# Patient Record
Sex: FEMALE | Race: BLACK OR AFRICAN AMERICAN
[De-identification: names, ages, dates, MRNs, and addresses within clinical notes are randomized per-mention and may not be internally consistent; named-entity substitution may affect disease eponyms.]

---

## 2018-05-09 ENCOUNTER — HOSPITAL ENCOUNTER (EMERGENCY)
Dept: HOSPITAL 97 - ER | Age: 33
Discharge: LEFT BEFORE DISCHARGE | End: 2018-05-09
Payer: SELF-PAY

## 2018-05-09 VITALS — DIASTOLIC BLOOD PRESSURE: 70 MMHG | OXYGEN SATURATION: 99 % | SYSTOLIC BLOOD PRESSURE: 124 MMHG | TEMPERATURE: 98.4 F

## 2018-05-09 DIAGNOSIS — T88.8XXA: Primary | ICD-10-CM

## 2018-05-09 PROCEDURE — 99281 EMR DPT VST MAYX REQ PHY/QHP: CPT

## 2018-05-09 NOTE — ER
Nurse's Notes                                                                                     

 [facility]                                                                

Name: Supriya Mckee                                                                              

Age: 32 yrs                                                                                       

Sex: Female                                                                                       

: 1985                                                                                   

MRN: [id number]                                                                                   

Arrival Date: 2018                                                                          

Time: 17:27                                                                                       

Account#: [id number]                                                                            

Bed Waiting                                                                                       

Private MD:                                                                                       

Diagnosis:                                                                                        

                                                                                                  

Presentation:                                                                                     

                                                                                             

17:41 Presenting complaint: Patient states: tummy tuck done 14 days go in [place]. my incision ch  

      is open, smells bad, and hurts. Transition of care: patient was not received from           

      another setting of care. Onset of symptoms was 2018. Initial Sepsis Screen:       

      Does the patient meet any 2 criteria? No. Patient's initial sepsis screen is negative.      

      Does the patient have a suspected source of infection? No. Patient's initial sepsis         

      screen is negative. Care prior to arrival: None.                                            

17:41 Method Of Arrival: Wheelchair                                                             

17:41 Acuity: MAYELA 3                                                                           ch  

                                                                                                  

Triage Assessment:                                                                                

17:41 General: Appears in no apparent distress. comfortable, Behavior is calm, cooperative,   ch  

      appropriate for age. Pain: Complains of pain in suprapubic area, right lower quadrant       

      and left lower quadrant Pain currently is 7 out of 10 on a pain scale.                      

                                                                                                  

OB/GYN:                                                                                           

17:41 LMP 2018                                                                            ch  

                                                                                                  

Historical:                                                                                       

- Allergies:                                                                                      

17:45 No Known Allergies;                                                                     ch  

- Home Meds:                                                                                      

17:45 amoxicillin, arnica, ordgon for pain, [Active]; Tylenol PM Extra Strength oral oral     ch  

      [Active];                                                                                   

- PMHx:                                                                                           

17:45 PCOS;                                                                                   ch  

- PSHx:                                                                                           

17:45 tummy tuck;                                                                             ch  

                                                                                                  

- Immunization history:: Adult Immunizations up to date, Flu vaccine is not up to date.           

- Social history:: Smoking status: Patient/guardian denies using tobacco.                         

                                                                                                  

                                                                                                  

Vital Signs:                                                                                      

17:41  / 70; Pulse 77; Resp 16; Temp 98.4; Pulse Ox 99% on R/A; Weight 83.91 kg; Height ch  

      5 ft. 7 in. (170.18 cm); Pain 6/10;                                                         

17:41 Body Mass Index 28.97 (83.91 kg, 170.18 cm)                                             ch  

                                                                                                  

ED Course:                                                                                        

17:27 Patient arrived in ED.                                                                  sb2 

17:41 Arm band placed on left wrist. Patient placed in waiting room, in a wheelchair.         ch  

17:42 Triage completed.                                                                       ch  

17:49 Herlinda Rebolledo FNP-C is [facility].                                                        kb  

17:49 Kong Bello MD is Attending Physician.                                              kb  

18:37 Patient's name was called from ER lobby. Unable to locate patient. Will disposition as    

      left without being seen by a provider.                                                      

                                                                                                  

Administered Medications:                                                                         

No medications were administered                                                                  

                                                                                                  

                                                                                                  

Outcome:                                                                                          

18:37 Eloped from waiting room, after seeing physician Time discovered patient gone: May 09,    

      2018 at 18:37                                                                               

18:38 Patient left the ED.                                                                      

                                                                                                  

Signatures:                                                                                       

Herlinda Rebolledo FNP-C FNP-Ckb Hammond, Christina, RN                  RN                                                      

Laura Rolon                               sb2                                                  

                                                                                                  

**************************************************************************************************

## 2018-05-10 NOTE — EDPHYS
Physician Documentation                                                                           

 [facility]                                                                

Name: Supriya Mckee                                                                              

Age: 32 yrs                                                                                       

Sex: Female                                                                                       

: 1985                                                                                   

MRN: [id number]                                                                                   

Arrival Date: 2018                                                                          

Time: 17:27                                                                                       

Account#: [id number]                                                                            

Bed Waiting                                                                                       

Private MD:                                                                                       

ED Physician Kong Bello                                                                       

HPI:                                                                                              

                                                                                             

18:43 This 32 yrs old Black Female presents to ER via Wheelchair with complaints of INFECTION kb  

      AND PAQIN FROM TUMMY TUCK.                                                                  

18:54 The patient has not experienced similar symptoms in the past. The patient has been      kb  

      recently seen by a physician:.                                                              

18:54 Pt states she had a tummy tuck in [place] 2 weeks ago. States she came home a week and a kb  

      half postop and started moving around more than she was supposed to so she popped a         

      stitch. States the incision slowly started opening and is now draining and smells bad.      

      . Onset: The symptoms/episode began/occurred 4 day(s) ago. Severity of symptoms: At         

      their worst the symptoms were moderate in the emergency department the symptoms are         

      unchanged.                                                                                  

                                                                                                  

OB/GYN:                                                                                           

17:41 LMP 2018                                                                            ch  

                                                                                                  

Historical:                                                                                       

- Allergies:                                                                                      

17:45 No Known Allergies;                                                                     ch  

- Home Meds:                                                                                      

17:45 amoxicillin, arnica, ordgon for pain, [Active]; Tylenol PM Extra Strength oral oral     ch  

      [Active];                                                                                   

- PMHx:                                                                                           

17:45 PCOS;                                                                                   ch  

- PSHx:                                                                                           

17:45 tummy tuck;                                                                             ch  

                                                                                                  

- Immunization history:: Adult Immunizations up to date, Flu vaccine is not up to date.           

- Social history:: Smoking status: Patient/guardian denies using tobacco.                         

                                                                                                  

                                                                                                  

ROS:                                                                                              

18:53 Constitutional: Negative for fever, chills, and weight loss, Cardiovascular: Negative   kb  

      for chest pain, palpitations, and edema, Respiratory: Negative for shortness of breath,     

      cough, wheezing, and pleuritic chest pain, Abdomen/GI: Negative for abdominal pain,         

      nausea, vomiting, diarrhea, and constipation, Back: Negative for injury and pain, :       

      Negative for injury, bleeding, discharge, and swelling, MS/Extremity: Negative for          

      injury and deformity, Neuro: Negative for headache, weakness, numbness, tingling, and       

      seizure.                                                                                    

18:53 Skin: Positive for of the abdomen, surgical incision with malodorous drainage.              

                                                                                                  

Exam:                                                                                             

18:52 Constitutional:  This is a well developed, well nourished patient who is awake, alert,  kb  

      and in no acute distress. Head/Face:  Normocephalic, atraumatic. Chest/axilla:  Normal      

      chest wall appearance and motion.  Nontender with no deformity.  No lesions are             

      appreciated. Cardiovascular:  Regular rate and rhythm with a normal S1 and S2.  No          

      gallops, murmurs, or rubs.  Normal PMI, no JVD.  No pulse deficits. Respiratory:  Lungs     

      have equal breath sounds bilaterally, clear to auscultation and percussion.  No rales,      

      rhonchi or wheezes noted.  No increased work of breathing, no retractions or nasal          

      flaring. Back:  No spinal tenderness.  No costovertebral tenderness.  Full range of         

      motion. MS/ Extremity:  Pulses equal, no cyanosis.  Neurovascular intact.  Full, normal     

      range of motion. Neuro:  Awake and alert, GCS 15, oriented to person, place, time, and      

      situation.  Cranial nerves II-XII grossly intact.  Motor strength 5/5 in all                

      extremities.  Sensory grossly intact.  Cerebellar exam normal.  Normal gait.                

18:52 Skin: Wound recheck: surgical incision to abdomen with purulent drainage.                   

                                                                                                  

Vital Signs:                                                                                      

17:41  / 70; Pulse 77; Resp 16; Temp 98.4; Pulse Ox 99% on R/A; Weight 83.91 kg; Height ch  

      5 ft. 7 in. (170.18 cm); Pain 6/10;                                                         

17:41 Body Mass Index 28.97 (83.91 kg, 170.18 cm)                                             ch  

                                                                                                  

MDM:                                                                                              

17:49 Patient medically screened.                                                             kb  

18:52 Data reviewed: vital signs, nurses notes. Data interpreted: Pulse oximetry: on room air kb  

      is 99 %. Interpretation: normal.                                                            

19:37 ED course: Pt was seen in triage, informed of plan of care (CBC, BMP, Wound Culture,    kb  

      Blood Culture, IV antibiotics, and possible CT abd/pelvis to r/o abscess), and told she     

      would be brought to a room to have diagnostics done as soon as one was available.           

      Verbal understanding received and pt was in agreement with plan of care. Pt left            

      shortly after being placed back into the lobby. .                                           

                                                                                                  

Administered Medications:                                                                         

No medications were administered                                                                  

                                                                                                  

                                                                                                  

Disposition:                                                                                      

22:35 Co-signature as Attending Physician, Kong Bello MD I agree with the assessment and   kdr 

      plan of care.                                                                               

                                                                                                  

Disposition:                                                                                      

18 18:38 Patient left the facility after being seen by provider.                            

- Patient left due to unknown.                                                                    

                                                                                                  

                                                                                                  

                                                                                                  

                                                                                                  

Signatures:                                                                                       

Herlinda Rebolledo FNP-C FNP-Elba Reyes, RN                  RN   Kong Hahn MD MD   kdr                                                  

                                                                                                  

**************************************************************************************************

## 2018-11-23 ENCOUNTER — HOSPITAL ENCOUNTER (EMERGENCY)
Dept: HOSPITAL 97 - ER | Age: 33
Discharge: HOME | End: 2018-11-23
Payer: SELF-PAY

## 2018-11-23 VITALS — OXYGEN SATURATION: 100 % | DIASTOLIC BLOOD PRESSURE: 90 MMHG | SYSTOLIC BLOOD PRESSURE: 120 MMHG

## 2018-11-23 VITALS — TEMPERATURE: 98 F

## 2018-11-23 DIAGNOSIS — N93.9: Primary | ICD-10-CM

## 2018-11-23 LAB
BUN BLD-MCNC: 7 MG/DL (ref 7–18)
GLUCOSE SERPLBLD-MCNC: 70 MG/DL (ref 74–106)
HCG SERPL-ACNC: < 1 MIU/ML (ref 1–3)
HCT VFR BLD CALC: 35.3 % (ref 36–45)
LYMPHOCYTES # SPEC AUTO: 1.4 K/UL (ref 0.7–4.9)
MCH RBC QN AUTO: 25.3 PG (ref 27–35)
MCV RBC: 76 FL (ref 80–100)
PMV BLD: 7.8 FL (ref 7.6–11.3)
POTASSIUM SERPL-SCNC: 3.5 MMOL/L (ref 3.5–5.1)
RBC # BLD: 4.64 M/UL (ref 3.86–4.86)

## 2018-11-23 PROCEDURE — 80048 BASIC METABOLIC PNL TOTAL CA: CPT

## 2018-11-23 PROCEDURE — 76830 TRANSVAGINAL US NON-OB: CPT

## 2018-11-23 PROCEDURE — 81025 URINE PREGNANCY TEST: CPT

## 2018-11-23 PROCEDURE — 36415 COLL VENOUS BLD VENIPUNCTURE: CPT

## 2018-11-23 PROCEDURE — 86901 BLOOD TYPING SEROLOGIC RH(D): CPT

## 2018-11-23 PROCEDURE — 86900 BLOOD TYPING SEROLOGIC ABO: CPT

## 2018-11-23 PROCEDURE — 96374 THER/PROPH/DIAG INJ IV PUSH: CPT

## 2018-11-23 PROCEDURE — 84702 CHORIONIC GONADOTROPIN TEST: CPT

## 2018-11-23 PROCEDURE — 96361 HYDRATE IV INFUSION ADD-ON: CPT

## 2018-11-23 PROCEDURE — 99284 EMERGENCY DEPT VISIT MOD MDM: CPT

## 2018-11-23 PROCEDURE — 85025 COMPLETE CBC W/AUTO DIFF WBC: CPT

## 2018-11-23 NOTE — EDPHYS
Physician Documentation                                                                           

 [facility]                                                                

Name: Supriya Mckee                                                                              

Age: 33 yrs                                                                                       

Sex: Female                                                                                       

: 1985                                                                                   

MRN: [id number]                                                                                   

Arrival Date: 2018                                                                          

Time: 12:38                                                                                       

Account#: [id number]                                                                            

Bed 30                                                                                            

Private MD: Kunal Aldana R ED Physician Juan J Brown                                                                      

HPI:                                                                                              

                                                                                             

15:14 This 33 yrs old Black Female presents to ER via Ambulatory with complaints of Vaginal   reddy 

      Bleeding, Weakness, Anemia.                                                                 

15:14 This 33 yrs old Black Female presents to ER via Ambulatory with complaints of Vaginal   reddy 

      Bleeding, Weakness, Anemia.                                                                 

15:14 The patient presents with vaginal bleeding that is moderate. Onset: The                 reddy 

      symptoms/episode began/occurred 2 week(s) ago. Modifying factors: The symptoms are          

      alleviated by nothing, the symptoms are aggravated by nothing. Associated signs and         

      symptoms: The patient has no apparent associated signs or symptoms. Severity of             

      symptoms: At their worst the symptoms were mild, in the emergency department the            

      symptoms are unchanged. The patient is sexually active. The patient has not experienced     

      similar symptoms in the past.                                                               

                                                                                                  

OB/GYN:                                                                                           

12:54 LMP 2018                                                                              aa5 

                                                                                                  

Historical:                                                                                       

- Allergies:                                                                                      

12:54 No Known Allergies;                                                                     aa5 

- PMHx:                                                                                           

12:54 PCOS;                                                                                   aa5 

- PSHx:                                                                                           

12:54 tummy tuck;                                                                             aa5 

                                                                                                  

- Immunization history:: Adult Immunizations unknown.                                             

- Social history:: Smoking status: Patient uses tobacco products, denies chronic                  

  smoking, but will smoke occasionally.                                                           

- Ebola Screening: : No symptoms or risks identified at this time.                                

- Family history:: not pertinent.                                                                 

                                                                                                  

                                                                                                  

ROS:                                                                                              

15:14 Constitutional: Negative for fever, chills, and weight loss, Eyes: Negative for injury, reddy 

      pain, redness, and discharge, ENT: Negative for injury, pain, and discharge, Neck:          

      Negative for injury, pain, and swelling, Cardiovascular: Negative for chest pain,           

      palpitations, and edema, Respiratory: Negative for shortness of breath, cough,              

      wheezing, and pleuritic chest pain, Abdomen/GI: Negative for abdominal pain, nausea,        

      vomiting, diarrhea, and constipation, Back: Negative for injury and pain, MS/Extremity:     

      Negative for injury and deformity, Skin: Negative for injury, rash, and discoloration,      

      Neuro: Negative for headache, weakness, numbness, tingling, and seizure, Psych:             

      Negative for depression, anxiety, suicide ideation, homicidal ideation, and                 

      hallucinations, Allergy/Immunology: Negative for hives, rash, and allergies, Endocrine:     

      Negative for neck swelling, polydipsia, polyuria, polyphagia, and marked weight             

      changes, Hematologic/Lymphatic: Negative for swollen nodes, abnormal bleeding, and          

      unusual bruising.                                                                           

15:14 : Positive for pelvic pain, vaginal bleeding.                                             

                                                                                                  

Exam:                                                                                             

15:14 Constitutional:  This is a well developed, well nourished patient who is awake, alert,  reddy 

      and in no acute distress. Head/Face:  Normocephalic, atraumatic. Eyes:  Pupils equal        

      round and reactive to light, extra-ocular motions intact.  Lids and lashes normal.          

      Conjunctiva and sclera are non-icteric and not injected.  Cornea within normal limits.      

      Periorbital areas with no swelling, redness, or edema. ENT:  Nares patent. No nasal         

      discharge, no septal abnormalities noted.  Tympanic membranes are normal and external       

      auditory canals are clear.  Oropharynx with no redness, swelling, or masses, exudates,      

      or evidence of obstruction, uvula midline.  Mucous membranes moist. Neck:  Trachea          

      midline, no thyromegaly or masses palpated, and no cervical lymphadenopathy.  Supple,       

      full range of motion without nuchal rigidity, or vertebral point tenderness.  No            

      Meningismus. Chest/axilla:  Normal chest wall appearance and motion.  Nontender with no     

      deformity.  No lesions are appreciated. Cardiovascular:  Regular rate and rhythm with a     

      normal S1 and S2.  No gallops, murmurs, or rubs.  Normal PMI, no JVD.  No pulse             

      deficits. Respiratory:  Lungs have equal breath sounds bilaterally, clear to                

      auscultation and percussion.  No rales, rhonchi or wheezes noted.  No increased work of     

      breathing, no retractions or nasal flaring. Abdomen/GI:  Soft, non-tender, with normal      

      bowel sounds.  No distension or tympany.  No guarding or rebound.  No evidence of           

      tenderness throughout. Back:  No spinal tenderness.  No costovertebral tenderness.          

      Full range of motion. Skin:  Warm, dry with normal turgor.  Normal color with no            

      rashes, no lesions, and no evidence of cellulitis. MS/ Extremity:  Pulses equal, no         

      cyanosis.  Neurovascular intact.  Full, normal range of motion. Neuro:  Awake and           

      alert, GCS 15, oriented to person, place, time, and situation.  Cranial nerves II-XII       

      grossly intact.  Motor strength 5/5 in all extremities.  Sensory grossly intact.            

      Cerebellar exam normal.  Normal gait. Psych:  Awake, alert, with orientation to person,     

      place and time.  Behavior, mood, and affect are within normal limits.                       

                                                                                                  

Vital Signs:                                                                                      

12:54  / 70; Pulse 90; Resp 18 S; Temp 98.0(TE); Pulse Ox 100% on R/A; Weight 85.73 kg  aa5 

      (R); Height 5 ft. 7 in. (170.18 cm) (R); Pain 6/10;                                         

13:24  / 68; Pulse 76; Resp 18; Pulse Ox 100% on R/A;                                   tl3 

15:03  / 79; Pulse 79; Resp 18; Pulse Ox 98% on R/A;                                    tl3 

15:40  / 68 Supine; Pulse 63; Resp 18; Pulse Ox 100% ;                                  tl3 

15:40  / 82 Sitting; Pulse 75; Resp 18; Pulse Ox 100% ;                                 tl3 

15:40  / 90 Standing; Pulse 81; Resp 18; Pulse Ox 100% on R/A;                          tl3 

16:18  / 90; Pulse 73; Resp 18; Pulse Ox 100% on R/A;                                   tl3 

12:54 Body Mass Index 29.60 (85.73 kg, 170.18 cm)                                             aa5 

                                                                                                  

MDM:                                                                                              

12:58 Patient medically screened.                                                             [facility] 

15:22 Data reviewed: vital signs, nurses notes, lab test result(s).                           [facility] 

15:25 Physician consultation: Patrice Wilder MD regarding patient's condition, CT,         [facility] 

      continue plan, add iron pills, follow up dr mendez.                                          

                                                                                                  

                                                                                             

13:00 Order name: Quantitative Hcg; Complete Time: 15:13                                      [facility] 

                                                                                             

13:00 Order name: Abo/rh Typing; Complete Time: 15:13                                         [facility] 

                                                                                             

13:00 Order name: Basic Metabolic Panel; Complete Time: 15:13                                 [facility] 

                                                                                             

13:00 Order name: CBC with Diff; Complete Time: 15:13                                         [facility] 

                                                                                             

13:00 Order name: US Transvaginal Study (Probe)                                               [facility] 

                                                                                             

14:45 Order name: Pregnancy Test, Urine; Complete Time: 15:44                                 [facility]

                                                                                             

13:00 Order name: Urine Pregnancy Test (obtain specimen); Complete Time: 15:00                [facility] 

                                                                                             

13:00 Order name: IV Saline Lock; Complete Time: 13:30                                        [facility] 

                                                                                             

13:00 Order name: Labs collected and sent; Complete Time: 13:30                               [facility] 

                                                                                             

13:00 Order name: NPO; Complete Time: 13:30                                                   [facility] 

                                                                                             

13:00 Order name: Urine Dipstick-Ancillary (obtain specimen); Complete Time: 15:00            [facility] 

                                                                                             

15:24 Order name: Orthostatics; Complete Time: 15:41                                          [facility] 

                                                                                                  

Administered Medications:                                                                         

13:37 Drug: NS 0.9% 1000 ml Route: IV; Rate: 1 bolus; Site: right forearm; Delivery: Primary  tl3 

      tubing;                                                                                     

14:58 Follow up: IV Status: Completed infusion; IV Intake: 1000ml                             tl3 

15:48 Drug: TORadol 30 mg Route: IVP; Infused Over: 2 mins; Site: right forearm;              tl3 

16:19 Follow up: Response: No adverse reaction                                                tl3 

                                                                                                  

                                                                                                  

Disposition:                                                                                      

18 15:19 Discharged to Home. Impression: Abnormal uterine and vaginal bleeding,             

  unspecified.                                                                                    

- Condition is Stable.                                                                            

- Discharge Instructions: Abnormal Uterine Bleeding, Dysmenorrhea, Dysfunctional                  

  Uterine Bleeding, Abnormal Uterine Bleeding, Easy-to-Read, Dysmenorrhea, Easy-to-Read.          

- Prescriptions for Ferrous Sulfate 325 mg (65 mg Iron) Oral Tablet - take 1 tablet by            

  ORAL route every 8 hours; 90 tablet. Motrin  mg Oral Tablet - take 2 tablet by            

  ORAL route every 6 hours As needed as needed with food; 28 tablet.                              

- Medication Reconciliation Form, Thank You Letter, Antibiotic Education, Prescription            

  Opioid Use form.                                                                                

- Follow up: Kunal Aldana MD; When: 2 - 3 days; Reason: Recheck today's complaints,              

  Continuance of care, Re-evaluation by your physician. Follow up: Brando Mendez MD;           

  When: 2 - 3 days; Reason: Recheck today's complaints, Continuance of care,                      

  Re-evaluation by your physician.                                                                

- Problem is new.                                                                                 

- Symptoms have improved.                                                                         

                                                                                                  

                                                                                                  

                                                                                                  

Signatures:                                                                                       

Dispatcher [employer]                           Juan J Maguire MD MD cha Calderon, Audri, RN                     RN   aa5                                                  

Monica Akins RN                       RN   tl3                                                  

                                                                                                  

Corrections: (The following items were deleted from the chart)                                    

15:20 15:19 2018 15:19 Discharged to Home. Impression: Abnormal uterine and vaginal     reddy 

      bleeding, unspecified. Condition is Stable. Forms are Medication Reconciliation Form,       

      Thank You Letter, Antibiotic Education, Prescription Opioid Use. Follow up: Kunal Aldana; When: 2 - 3 days; Reason: Recheck today's complaints, Continuance of care,           

      Re-evaluation by your physician. Problem is new. Symptoms have improved. reddy                

16:21 15:20 2018 15:19 Discharged to Home. Impression: Abnormal uterine and vaginal     tl3 

      bleeding, unspecified. Condition is Stable. Forms are Medication Reconciliation Form,       

      Thank You Letter, Antibiotic Education, Prescription Opioid Use. Follow up: Kunal Aldana; When: 2 - 3 days; Reason: Recheck today's complaints, Continuance of care,           

      Re-evaluation by your physician. Follow up: Brando Mendez; When: 2 - 3 days; Reason:       

      Recheck today's complaints, Continuance of care, Re-evaluation by your physician.           

      Problem is new. Symptoms have improved. reddy                                                 

                                                                                                  

**************************************************************************************************

## 2018-11-23 NOTE — ER
Nurse's Notes                                                                                     

 [facility]                                                                

Name: Supriya Mckee                                                                              

Age: 33 yrs                                                                                       

Sex: Female                                                                                       

: 1985                                                                                   

MRN: [id number]                                                                                   

Arrival Date: 2018                                                                          

Time: 12:38                                                                                       

Account#: [id number]                                                                            

Bed 30                                                                                            

Private MD: Kunal Aldana R                                                                       

Diagnosis: Abnormal uterine and vaginal bleeding, unspecified                                     

                                                                                                  

Presentation:                                                                                     

                                                                                             

12:51 Presenting complaint: Patient states: vaginal bleeding that began 2 months ago. Pt      aa5 

      states "I go through a pad and a tampon every 30 minutes since ". Pt reports abd      

      cramping. Transition of care: patient was not received from another setting of care.        

      Onset of symptoms was 2018. Risk Assessment: Do you want to hurt yourself or       

      someone else? Patient reports no desire to harm self or others. Initial Sepsis Screen:      

      Does the patient meet any 2 criteria? No. Patient's initial sepsis screen is negative.      

      Does the patient have a suspected source of infection? No. Patient's initial sepsis         

      screen is negative. Care prior to arrival: None.                                            

12:51 Method Of Arrival: Ambulatory                                                           aa5 

12:51 Acuity: MAYELA 2                                                                           aa5 

                                                                                                  

OB/GYN:                                                                                           

12:54 LMP 2018                                                                              aa5 

                                                                                                  

Historical:                                                                                       

- Allergies:                                                                                      

12:54 No Known Allergies;                                                                     aa5 

- PMHx:                                                                                           

12:54 PCOS;                                                                                   aa5 

- PSHx:                                                                                           

12:54 tummy tuck;                                                                             aa5 

                                                                                                  

- Immunization history:: Adult Immunizations unknown.                                             

- Social history:: Smoking status: Patient uses tobacco products, denies chronic                  

  smoking, but will smoke occasionally.                                                           

- Ebola Screening: : No symptoms or risks identified at this time.                                

- Family history:: not pertinent.                                                                 

                                                                                                  

                                                                                                  

Screenin:24 Abuse screen: Denies threats or abuse. Nutritional screening: No deficits noted.        tl3 

      Tuberculosis screening: No symptoms or risk factors identified. Fall Risk None              

      identified.                                                                                 

                                                                                                  

Assessment:                                                                                       

13:24 General: Appears comfortable, well groomed, well developed, well nourished, Behavior is tl3 

      cooperative, appropriate for age, anxious. Pain: Complains of pain in abdomen. Neuro:       

      No deficits noted. Level of Consciousness is awake, alert, obeys commands, Oriented to      

      person, place, time, situation, Appropriate for age. Cardiovascular: Patient's skin is      

      warm and dry. Respiratory: Airway is patent Respiratory effort is even, unlabored,          

      Respiratory pattern is regular, symmetrical. GI: No signs and/or symptoms were reported     

      involving the gastrointestinal system. : Reports vaginal bleeding that is bright red,     

      with clots, heavy flow since for the last couple of months, has been followed by Dr Carey, and placed on birth control twice daily to help regulate flow. Currently going       

      through a super tampon and maxi pad in under two hours. EENT: No deficits noted. Derm:      

      No deficits noted. Musculoskeletal: No deficits noted.                                      

15:03 Reassessment: Patient appears in no apparent distress at this time. No changes from     tl3 

      previously documented assessment. Patient and/or family updated on plan of care and         

      expected duration. Pain level reassessed. Patient is alert, oriented x 3, equal             

      unlabored respirations, skin warm/dry/pink. family at bedside, discussed available lab      

      results, no needs at this time.                                                             

16:18 Reassessment: Patient appears in no apparent distress at this time. No changes from     tl3 

      previously documented assessment. Patient and/or family updated on plan of care and         

      expected duration. Pain level reassessed. Patient is alert, oriented x 3, equal             

      unlabored respirations, skin warm/dry/pink. Dr Brown talking with pt and family          

      about POC.                                                                                  

                                                                                                  

Vital Signs:                                                                                      

12:54  / 70; Pulse 90; Resp 18 S; Temp 98.0(TE); Pulse Ox 100% on R/A; Weight 85.73 kg  aa5 

      (R); Height 5 ft. 7 in. (170.18 cm) (R); Pain 6/10;                                         

13:24  / 68; Pulse 76; Resp 18; Pulse Ox 100% on R/A;                                   tl3 

15:03  / 79; Pulse 79; Resp 18; Pulse Ox 98% on R/A;                                    tl3 

15:40  / 68 Supine; Pulse 63; Resp 18; Pulse Ox 100% ;                                  tl3 

15:40  / 82 Sitting; Pulse 75; Resp 18; Pulse Ox 100% ;                                 tl3 

15:40  / 90 Standing; Pulse 81; Resp 18; Pulse Ox 100% on R/A;                          tl3 

16:18  / 90; Pulse 73; Resp 18; Pulse Ox 100% on R/A;                                   tl3 

12:54 Body Mass Index 29.60 (85.73 kg, 170.18 cm)                                             aa5 

                                                                                                  

ED Course:                                                                                        

12:38 Patient arrived in ED.                                                                  mr  

12:39 Kunal Aldana MD is Private Physician.                                                  mr  

12:51 Arm band placed on.                                                                     aa5 

12:54 Triage completed.                                                                       aa5 

12:58 Juan J Brown MD is Attending Physician.                                             [facility] 

13:24 Monica Akins, RN is Primary Nurse.                                                     tl3 

13:24 Patient has correct armband on for positive identification. Bed in low position. Call   tl3 

      light in reach. Side rails up X 1. Side rails up X2. Pulse ox on. NIBP on. Warm blanket     

      given.                                                                                      

13:24 No provider procedures requiring assistance completed. Inserted saline lock: 22 gauge   tl3 

      in right forearm, using aseptic technique.                                                  

14:25 US Transvaginal Study (Probe) In Process Unspecified.                                   EDMS

15:00 Pregnancy Test, Urine Sent.                                                             tl3 

15:18 Kunal Aldana MD is Referral Physician.                                                 [facility] 

15:19 Brando Holley MD is Referral Physician.                                              [facility] 

16:18 IV discontinued, intact, bleeding controlled, No redness/swelling at site. Pressure     tl3 

      dressing applied.                                                                           

                                                                                                  

Administered Medications:                                                                         

13:37 Drug: NS 0.9% 1000 ml Route: IV; Rate: 1 bolus; Site: right forearm; Delivery: Primary  tl3 

      tubing;                                                                                     

14:58 Follow up: IV Status: Completed infusion; IV Intake: 1000ml                             tl3 

15:48 Drug: TORadol 30 mg Route: IVP; Infused Over: 2 mins; Site: right forearm;              tl3 

16:19 Follow up: Response: No adverse reaction                                                tl3 

                                                                                                  

                                                                                                  

Intake:                                                                                           

14:58 IV: 1000ml; Total: 1000ml.                                                              tl3 

                                                                                                  

Outcome:                                                                                          

15:19 Discharge ordered by MD.                                                                [facility] 

16:18 Discharged to home ambulatory.                                                          tl3 

16:18 Condition: stable                                                                           

16:18 Discharge instructions given to patient, Instructed on discharge instructions, follow       

      up and referral plans. medication usage, Demonstrated understanding of instructions,        

      follow-up care, medications, Prescriptions given X 2.                                       

16:21 Patient left the ED.                                                                    tl3 

                                                                                                  

Signatures:                                                                                       

Dispatcher [employer]                           EDMS                                                 

Juan J Brown MD MD cha Rivera, Mary                                 mr DockeyrGavi, RN                     RN   aa5                                                  

Monica Akins, RN                       RN   tl3                                                  

                                                                                                  

Corrections: (The following items were deleted from the chart)                                    

12:56 12:51 Acuity: MAYELA 3 aa5                                                                 aa5 

                                                                                                  

**************************************************************************************************

## 2018-11-23 NOTE — RAD REPORT
EXAM DESCRIPTION:  US - Transvaginal Study Probe - 11/23/2018 2:24 pm

 

CLINICAL HISTORY:  Pelvic pain, hyper menorrhagia

 

Preliminary findings provided at the time of the study.

 

COMPARISON:  None.

 

TECHNIQUE:  Endovaginal sonography was performed.

 

FINDINGS:  Small nabothian cyst identified. Endometrium-myometrium interface is normal. No discrete e
ndometrial mass or polyp identifiable. In the posterior mid and fundal portion of the uterus there is
 a 3 centimeter oval mass isoechoic to the surrounding myometrium. This extends from serosal 2 mucosa
l margin. There is minimal mass effect on the endometrial canal.

 

Endometrial stripe is 6 mm maximum thickness. Uterus measures 7.3 x 4.0 x 4.4 cm.

 

Both ovaries are identifiable. Doppler evaluation shows normal blood flow in the ovarian stroma. No s
olid or cystic ovarian or adnexal abnormality. No abnormal free fluid.

 

IMPRESSION:  Normal-size uterus demonstrating a 3 centimeter posterior fundal fibroid. There is mild 
mass effect on the endometrium but no invasion seen.

 

Endometrial stripe is 6 mm, normal, with no discrete endometrial mass or polyp.

 

No ovarian or adnexal finding.

## 2020-08-04 ENCOUNTER — HOSPITAL ENCOUNTER (EMERGENCY)
Dept: HOSPITAL 97 - ER | Age: 35
Discharge: HOME | End: 2020-08-04
Payer: SELF-PAY

## 2020-08-04 VITALS — SYSTOLIC BLOOD PRESSURE: 146 MMHG | OXYGEN SATURATION: 100 % | DIASTOLIC BLOOD PRESSURE: 102 MMHG

## 2020-08-04 VITALS — TEMPERATURE: 98 F
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
## 2020-08-04 DIAGNOSIS — S61.511A: Primary | ICD-10-CM

## 2020-08-04 DIAGNOSIS — F17.210: ICD-10-CM
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
## 2020-08-04 PROCEDURE — 99284 EMERGENCY DEPT VISIT MOD MDM: CPT

## 2020-08-04 PROCEDURE — 90714 TD VACC NO PRESV 7 YRS+ IM: CPT

## 2020-08-04 PROCEDURE — 90471 IMMUNIZATION ADMIN: CPT

## 2020-08-04 PROCEDURE — 0JQG0ZZ REPAIR RIGHT LOWER ARM SUBCUTANEOUS TISSUE AND FASCIA, OPEN APPROACH: ICD-10-PCS

## 2020-08-04 NOTE — RAD REPORT
EXAM DESCRIPTION:  RAD - Wrist Right 3 View - 8/4/2020 3:46 am

 

CLINICAL HISTORY:  wrist injury, laceration, eval for foreign body

Pain

 

COMPARISON:  No comparisons

 

FINDINGS:  No fracture or dislocation seen.  No foreign body or other soft tissue abnormality.

 

 

IMPRESSION:  Negative examination.

## 2020-08-04 NOTE — ER
Nurse's Notes                                                                                     

 [facility]                                                                 

Name: Supriya Mckee                                                                              

Age: 35 yrs                                                                                       

Sex: Female                                                                                       

: 1985                                                                                   

MRN: [id number]                                                                                   

Arrival Date: 2020                                                                          

Time: 02:54                                                                                       

Account#: [id number]                                                                            

Bed 18                                                                                            

Private MD:                                                                                       

Diagnosis: Superficial lacerations, right wrist, without foreign body                             

                                                                                                  

Presentation:                                                                                     

                                                                                             

03:18 Chief complaint: Patient states: PER PT WAS LOCKED OUT OF HOUSE TRIED TO GET IN BY      mt2 

      BREAKING WINDOW. 2 LACERATION ON RIGHT WRIST. Coronavirus screen: Client denies travel      

      out of the [place] in the last 14 days. At this time, the client does not indicate any         

      symptoms associated with coronavirus-19. Ebola Screen: No symptoms or risks identified      

      at this time. Complicating Factors: XRAY TO R/O GLASS. Initial Sepsis Screen: Does the      

      patient meet any 2 criteria? No. Patient's initial sepsis screen is negative. Does the      

      patient have a suspected source of infection? No. Patient's initial sepsis screen is        

      negative. Risk Assessment: Do you want to hurt yourself or someone else? Patient            

      reports no desire to harm self or others. Onset of symptoms was 2020.            

03:18 Method Of Arrival: Ambulatory                                                           mt2 

03:18 Acuity: MAYELA 4                                                                           mt2 

                                                                                                  

Triage Assessment:                                                                                

03:20 General: Appears uncomfortable, Behavior is cooperative. Pain: Complains of pain in     mt2 

      right hand Pain currently is 10 out of 10 on a pain scale. Neuro: No deficits noted.        

      Injury Description: Laceration sustained to right hand.                                     

                                                                                                  

OB/GYN:                                                                                           

04:39 LMP N/A -                                                                               mt2 

                                                                                                  

Historical:                                                                                       

- Allergies:                                                                                      

04:40 No Known Allergies;                                                                     mt2 

                                                                                                  

- Immunization history:: Adult Immunizations not up to date, Last tetanus immunization:           

  > 10 years ago.                                                                                 

- Family history:: not pertinent.                                                                 

- Social history:: Smoking status: Patient reports the use of cigarette tobacco                   

  products, denies chronic smoking, but will smoke occasionally, Patient/guardian                 

  denies using street drugs.                                                                      

- Hospitalizations: : No recent hospitalization is reported.                                      

                                                                                                  

                                                                                                  

Screenin:21 Abuse screen: Denies threats or abuse. Nutritional screening: No deficits noted.        mt2 

      Tuberculosis screening: No symptoms or risk factors identified. Fall Risk None              

      identified.                                                                                 

                                                                                                  

Assessment:                                                                                       

03:30 Injury Description: Laceration sustained to right hand is not bleeding.                 mt2 

04:38 Reassessment: Patient and/or family updated on plan of care and expected duration. Pain mt2 

      level reassessed. Patient is alert, oriented x 3, equal unlabored respirations, skin        

      warm/dry/pink. General: Appears uncomfortable. Pain: Complains of pain in right hand        

      Pain currently is 5 out of 10 on a pain scale. Musculoskeletal: No deficits noted.          

                                                                                                  

Vital Signs:                                                                                      

03:18  / 91; Pulse 91; Resp 16; Temp 98.0(O); Pulse Ox 98% ; Pain 10/10;                mt2 

04:38  / 102; Pulse 89; Pulse Ox 100% ; Pain 1/10;                                      mt2 

                                                                                                  

ED Course:                                                                                        

02:54 Patient arrived in ED.                                                                  ag3 

02:59 Awilda Medina, RN is Primary Nurse.                                                  mt2 

03:05 Nelson Miranda MD is Attending Physician.                                                rn  

03:20 Triage completed.                                                                       mt2 

03:21 Patient has correct armband on for positive identification. Bed in low position. Call   mt2 

      light in reach. Side rails up X 1.                                                          

03:30 Arm band placed on.                                                                     mt2 

03:47 XRAY Wrist RIGHT 3 view In Process Unspecified.                                         EDMS

04:39 Patient did not have IV access during this emergency room visit. Wound care: to         mt2 

      laceration was irrigated with normal saline.                                                

04:39 Assist provider with laceration repair Set up tray.                                     mt2 

                                                                                                  

Administered Medications:                                                                         

03:52 Drug: Lidocaine (1 %) 1 vials Volume: 5 ml; Route: Infiltration;                        mt2 

05:00 Follow up: Response: No adverse reaction                                                mt2 

03:52 Drug: Tetanus-Diphtheria Toxoid Adult 0.5 ml {: InboundWriter. Exp:         mt2 

      2023. Lot #: [id number]. } Route: IM; Site: left deltoid;                                  

05:00 Follow up: Response: No adverse reaction                                                mt2 

05:00 Drug: HYDROcodone-acetaminophen 5 mg-325 mg 1 tabs Route: PO;                           mt2 

05:01 Follow up: Response: Medication administered at discharge.                              mt2 

                                                                                                  

                                                                                                  

Outcome:                                                                                          

04:37 Discharge ordered by MD.                                                                rn  

04:59 Discharged to home ambulatory.                                                          mt2 

04:59 Condition: good                                                                             

04:59 Discharge instructions given to patient, Instructed on discharge instructions, follow       

      up and referral plans. medication usage, Demonstrated understanding of instructions,        

      follow-up care, medications, wound care.                                                    

05:02 Patient left the ED.                                                                    mt2 

                                                                                                  

Signatures:                                                                                       

Dispatcher [employer]                           EDNelson Munoz MD MD rn Gomez, Alice                                 [facility]                                                  

Awilda Medina RN                    RN   mt2                                                  

                                                                                                  

**************************************************************************************************

## 2021-05-13 ENCOUNTER — HOSPITAL ENCOUNTER (EMERGENCY)
Dept: HOSPITAL 97 - ER | Age: 36
Discharge: HOME | End: 2021-05-13
Payer: SELF-PAY

## 2021-05-13 VITALS — SYSTOLIC BLOOD PRESSURE: 132 MMHG | DIASTOLIC BLOOD PRESSURE: 83 MMHG

## 2021-05-13 VITALS — TEMPERATURE: 98.1 F | OXYGEN SATURATION: 100 %

## 2021-05-13 DIAGNOSIS — K21.9: ICD-10-CM

## 2021-05-13 DIAGNOSIS — O99.611: Primary | ICD-10-CM

## 2021-05-13 DIAGNOSIS — Z3A.01: ICD-10-CM

## 2021-05-13 DIAGNOSIS — F17.210: ICD-10-CM

## 2021-05-13 DIAGNOSIS — O99.331: ICD-10-CM

## 2021-05-13 LAB
ANISOCYTOSIS BLD QL: (no result)
BLD SMEAR INTERP: (no result)
BUN BLD-MCNC: 8 MG/DL (ref 7–18)
GLUCOSE SERPLBLD-MCNC: 87 MG/DL (ref 74–106)
HCT VFR BLD CALC: 29.2 % (ref 36–45)
HYPOCHROMIA BLD QL: (no result)
LIPASE SERPL-CCNC: 140 U/L (ref 73–393)
LYMPHOCYTES # SPEC AUTO: 1.6 K/UL (ref 0.7–4.9)
MORPHOLOGY BLD-IMP: (no result)
OVALOCYTES BLD QL SMEAR: (no result)
PMV BLD: 7.5 FL (ref 7.6–11.3)
POTASSIUM SERPL-SCNC: 4 MMOL/L (ref 3.5–5.1)
RBC # BLD: 4.64 M/UL (ref 3.86–4.86)
TROPONIN (EMERG DEPT USE ONLY): < 0.02 NG/ML (ref 0–0.04)

## 2021-05-13 PROCEDURE — 71045 X-RAY EXAM CHEST 1 VIEW: CPT

## 2021-05-13 PROCEDURE — 80048 BASIC METABOLIC PNL TOTAL CA: CPT

## 2021-05-13 PROCEDURE — 99285 EMERGENCY DEPT VISIT HI MDM: CPT

## 2021-05-13 PROCEDURE — 85025 COMPLETE CBC W/AUTO DIFF WBC: CPT

## 2021-05-13 PROCEDURE — 93005 ELECTROCARDIOGRAM TRACING: CPT

## 2021-05-13 PROCEDURE — 84484 ASSAY OF TROPONIN QUANT: CPT

## 2021-05-13 PROCEDURE — 83690 ASSAY OF LIPASE: CPT

## 2021-05-13 PROCEDURE — 36415 COLL VENOUS BLD VENIPUNCTURE: CPT

## 2021-05-13 NOTE — ER
Nurse's Notes                                                                                     

 [facility]                                                                 

Name: Supriya Mckee                                                                              

Age: 35 yrs                                                                                       

Sex: Female                                                                                       

: 1985                                                                                   

MRN: [id number]                                                                                   

Arrival Date: 2021                                                                          

Time: 20:52                                                                                       

Account#: [id number]                                                                            

Bed 15                                                                                            

Private MD:                                                                                       

Diagnosis: Chest pain, unspecified;Gastro-esophageal reflux disease                               

                                                                                                  

Presentation:                                                                                     

                                                                                             

21:01 Chief complaint: EMS states: About an hour ago pt began experiencing chest pressure     vg1 

      near mid sternum that would radiate to the right shoulder. Coronavirus screen: Client       

      denies travel out of the [place] in the last 14 days. Ebola Screen: Patient negative for       

      fever greater than or equal to 101.5 degrees Fahrenheit, and additional compatible          

      Ebola Virus Disease symptoms. Initial Sepsis Screen: Does the patient meet any 2            

      criteria? No. Patient's initial sepsis screen is negative. Does the patient have a          

      suspected source of infection? No. Patient's initial sepsis screen is negative. Risk        

      Assessment: Do you want to hurt yourself or someone else? Patient reports no desire to      

      harm self or others. Onset of symptoms was May 13, 2021.                                    

21:01 Method Of Arrival: EMS: [facility]                                                vg1 

21:01 Acuity: MAYELA 3                                                                           vg1 

                                                                                                  

OB/GYN:                                                                                           

21:07 LMP 2021                                                                           vg1 

                                                                                                  

Historical:                                                                                       

- Allergies:                                                                                      

21:04 No Known Allergies;                                                                     vg1 

- Home Meds:                                                                                      

21:04 Iron CR Oral [Active];                                                                  vg1 

- PMHx:                                                                                           

21:04 Anemia;                                                                                 vg1 

- PSHx:                                                                                           

21:04 Gastric Bypass; Abdominoplasty;                                                         vg1 

                                                                                                  

- Immunization history:: Adult Immunizations up to date.                                          

- Social history:: Smoking status: Patient reports the use of cigarette tobacco                   

  products, smokes one-half pack cigarettes per day.                                              

- Family history:: not pertinent.                                                                 

- Hospitalizations: : No recent hospitalization is reported.                                      

                                                                                                  

                                                                                                  

Screenin:06 Abuse screen: Denies threats or abuse. Nutritional screening: No deficits noted.        vg1 

      Tuberculosis screening: No symptoms or risk factors identified. Fall Risk No fall in        

      past 12 months (0 pts). No secondary diagnosis (0 pts). IV access (20 points).              

      Ambulatory Aid- None/Bed Rest/Nurse Assist (0 pts). Gait- Normal/Bed Rest/Wheelchair (0     

      pts) Mental Status- Oriented to own ability (0 pts). Total Neff Fall Scale indicates       

      No Risk (0-24 pts).                                                                         

                                                                                                  

Assessment:                                                                                       

20:50 General: Appears in no apparent distress. comfortable, Behavior is cooperative,         vg1 

      anxious. Pain: Complains of pain in chest Pain radiates to right shoulder Pain              

      currently is 5 out of 10 on a pain scale. Quality of pain is described as pressure,         

      Pain began 1 hour ago. Neuro: Level of Consciousness is awake, alert, obeys commands,       

      Oriented to person, place, time, situation. Cardiovascular: Patient's skin is warm and      

      dry. Respiratory: Airway is patent Respiratory effort is even, unlabored. GI: Reports       

      nausea. : No signs and/or symptoms were reported regarding the genitourinary system.      

      EENT: No signs and/or symptoms were reported regarding the EENT system. Derm: Skin is       

      intact, is healthy with good turgor. Musculoskeletal: Circulation, motion, and              

      sensation intact.                                                                           

22:11 Reassessment: Patient appears in no apparent distress at this time. Patient and/or      ca1 

      family updated on plan of care and expected duration. Pain level reassessed. Patient is     

      alert, oriented x 3, equal unlabored respirations, skin warm/dry/pink.                      

23:09 Reassessment: Patient appears in no apparent distress at this time. Patient is alert,   ca1 

      oriented x 3, equal unlabored respirations, skin warm/dry/pink. Pt refused to give          

      urine sample.                                                                               

                                                                                                  

Vital Signs:                                                                                      

21:01  / 97; Pulse 72; Resp 16; Temp 98.1; Pulse Ox 100% ; Weight 79.38 kg; Height 5    vg1 

      ft. 7 in. (170.18 cm); Pain 5/10;                                                           

23:09  / 83; Pulse 76; Resp 16 S; Pulse Ox 100% on R/A;                                 ca1 

21:01 Body Mass Index 27.41 (79.38 kg, 170.18 cm)                                             vg1 

                                                                                                  

ED Course:                                                                                        

20:52 Patient arrived in ED.                                                                  mw2 

20:57 Nelson Miranda MD is Attending Physician.                                                rn  

21:01 Barb Jensen, RN is Primary Nurse.                                                  vg1 

21:02 Triage completed.                                                                       vg1 

21:06 Patient maintains SpO2 saturation greater than 95% on room air.                         vg1 

21:06 Patient has correct armband on for positive identification. Placed in gown. Bed in low  vg1 

      position. Call light in reach. Side rails up X 1. [facility] officer at bedside.          

      Cardiac monitor on. Pulse ox on. NIBP on.                                                   

21:07 Arm band placed on.                                                                     vg1 

21:18 EKG done, by ED staff, reviewed by Nelson Miranda MD.                                      vg1 

21:30 Initial lab(s) drawn, by me, sent to lab. Inserted saline lock: 20 gauge in right       vg1 

      antecubital area, using aseptic technique. Blood collected.                                 

21:51 XRAY Chest (1 view) In Process Unspecified.                                             EDMS

22:24 Report given to JONAS Biswas.                                                             vg1 

23:09 No provider procedures requiring assistance completed. IV discontinued, intact,         ca1 

      bleeding controlled, No redness/swelling at site. Pressure dressing applied.                

                                                                                                  

Administered Medications:                                                                         

21:39 Drug: GI Cocktail without Donnatal - (Maalox Suspension 30 ml, Lidocaine Liquid 2 % 15  vg1 

      ml) Route: PO;                                                                              

23:00 Follow up: Response: No adverse reaction; Marked relief of symptoms; Pain is decreased  ca1 

                                                                                                  

                                                                                                  

Outcome:                                                                                          

22:43 Discharge ordered by MD.                                                                rn  

23:09 Discharged to Law Enforcement                                                           ca1 

23:09 Condition: stable                                                                           

23:09 Discharge instructions given to patient, Instructed on discharge instructions, follow       

      up and referral plans. Demonstrated understanding of instructions, follow-up care.          

23:10 Patient left the ED.                                                                    ca1 

                                                                                                  

Signatures:                                                                                       

Dispatcher [employer]                           EDMS                                                 

Nelson Miranda MD MD rn Westbrook, MyKena                            mw2                                                  

Romi, Zulay, RN                        RN   ca1                                                  

Barb Jensen RN                    RN   vg1                                                  

                                                                                                  

Corrections: (The following items were deleted from the chart)                                    

23:09 23:09 Reassessment: Patient appears in no apparent distress at this time. Patient is    ca1 

      alert, oriented x 3, equal unlabored respirations, skin warm/dry/pink. ca1                  

                                                                                                  

**************************************************************************************************

## 2021-05-13 NOTE — EDPHYS
Physician Documentation                                                                           

 [facility]                                                                 

Name: Supriya Mckee                                                                              

Age: 35 yrs                                                                                       

Sex: Female                                                                                       

: 1985                                                                                   

MRN: [id number]                                                                                   

Arrival Date: 2021                                                                          

Time: 20:52                                                                                       

Account#: [id number]                                                                            

Bed 15                                                                                            

Private MD:                                                                                       

ED Physician Nelson Miranda                                                                         

HPI:                                                                                              

                                                                                             

21:02 This 35 yrs old Black Female presents to ER via Unassigned with complaints of Chest     rn  

      Pain.                                                                                       

21:02 The patient or guardian reports chest pain that is located primarily in the substernal  rn  

      area. The pain does not radiate. Associated signs and symptoms: Pertinent negatives:        

      abdominal pain, cough, headache, near syncope, palpitations, shortness of breath,           

      syncope, vomiting. The chest pain is described as a heaviness. Duration: The patient or     

      guardian reports a single episode. Modifying factors: The symptoms are alleviated by        

      nothing. the symptoms are aggravated by nothing. Severity of pain: At its worst the         

      pain was moderate in the emergency department the pain has resolved. The patient has        

      not experienced similar symptoms in the past. Reports 15 min ago began with chest pain,     

      non-radiating, not assoc with fever/sob/trauma/hemoptysis. Never had before. In in          

      longterm. Reports approx 7 weeks pregnant. No vomiting/diarrhea. No meds given and now          

      resolved. .                                                                                 

                                                                                                  

OB/GYN:                                                                                           

21:07 LMP 2021                                                                           vg1 

                                                                                                  

Historical:                                                                                       

- Allergies:                                                                                      

21:04 No Known Allergies;                                                                     vg1 

- Home Meds:                                                                                      

21:04 Iron CR Oral [Active];                                                                  vg1 

- PMHx:                                                                                           

21:04 Anemia;                                                                                 vg1 

- PSHx:                                                                                           

21:04 Gastric Bypass; Abdominoplasty;                                                         vg1 

                                                                                                  

- Immunization history:: Adult Immunizations up to date.                                          

- Social history:: Smoking status: Patient reports the use of cigarette tobacco                   

  products, smokes one-half pack cigarettes per day.                                              

- Family history:: not pertinent.                                                                 

- Hospitalizations: : No recent hospitalization is reported.                                      

                                                                                                  

                                                                                                  

ROS:                                                                                              

21:02 Constitutional: Negative for fever, chills, and weight loss, Eyes: Negative for injury, rn  

      pain, redness, and discharge, Neck: Negative for injury, pain, and swelling,                

      Cardiovascular: Negative for palpitations, and edema, Respiratory: Negative for             

      shortness of breath, cough, wheezing, and pleuritic chest pain, Abdomen/GI: Negative        

      for abdominal pain, nausea, vomiting, diarrhea, and constipation, Back: Negative for        

      injury and pain, : Negative for injury, bleeding, discharge, and swelling,                

      MS/Extremity: Negative for injury and deformity, Skin: Negative for injury, rash, and       

      discoloration, Neuro: Negative for headache, weakness, numbness, tingling, and seizure.     

                                                                                                  

Exam:                                                                                             

21:02 Constitutional:  This is a well developed, well nourished patient who is awake, alert,  rn  

      and in no acute distress.                                                                   

22:37 ECG was reviewed by the Attending Physician.                                            rn  

                                                                                                  

Vital Signs:                                                                                      

21:01  / 97; Pulse 72; Resp 16; Temp 98.1; Pulse Ox 100% ; Weight 79.38 kg; Height 5    vg1 

      ft. 7 in. (170.18 cm); Pain 5/10;                                                           

23:09  / 83; Pulse 76; Resp 16 S; Pulse Ox 100% on R/A;                                 ca1 

21:01 Body Mass Index 27.41 (79.38 kg, 170.18 cm)                                             vg1 

                                                                                                  

MDM:                                                                                              

20:57 Patient medically screened.                                                             rn  

22:41 Differential diagnosis: acute pericarditis, anxiety, chest wall pain, costochondritis,  rn  

      esophagitis, gastritis, gastroesophageal reflux disease (GERD), pleurisy, pneumothorax.     

      Data reviewed: vital signs, nurses notes, lab test result(s), EKG, radiologic studies,      

      plain films, and as a result, I will discharge patient. Counseling: I had a detailed        

      discussion with the patient and/or guardian regarding: the historical points, exam          

      findings, and any diagnostic results supporting the discharge/admit diagnosis, lab          

      results, radiology results, the need for outpatient follow up, to return to the             

      emergency department if symptoms worsen or persist or if there are any questions or         

      concerns that arise at home. Response to treatment: the patient's symptoms have             

      resolved after treatment, the patient's condition has returned to base line, and as a       

      result, I will discharge patient. Refusal of service: The patient/guardian displays         

      adequate decision making capability and despite a detailed discussion of alternatives,      

      benefits, risks, and consequences refuses: urine. Special discussion: I discussed with      

      the patient/guardian in detail that at this point there is no indication for admission      

      to the hospital. It is understood, however, that if the symptoms persist or worsen the      

      patient needs to return immediately for re-evaluation. ED course: Pain resolved, neg        

      trop, normal ecg, smiling and sleeping currently. .                                         

                                                                                                  

                                                                                             

20:59 Order name: Basic Metabolic Panel                                                       rn  

                                                                                             

20:59 Order name: CBC with Diff                                                               rn  

                                                                                             

20:59 Order name: Troponin (emerg Dept Use Only); Complete Time: 22:32                        rn  

                                                                                             

20:59 Order name: Lipase; Complete Time: 22:32                                                rn  

                                                                                             

20:59 Order name: Basic Metabolic Panel; Complete Time: 22:32                                 EDMS

                                                                                             

20:59 Order name: XRAY Chest (1 view)                                                         rn  

                                                                                             

20:59 Order name: EKG; Complete Time: 20:59                                                   rn  

                                                                                             

20:59 Order name: Cardiac monitoring; Complete Time: 21:29                                    rn  

                                                                                             

20:59 Order name: EKG - Nurse/Tech; Complete Time: 21:29                                      rn  

                                                                                             

20:59 Order name: IV Saline Lock; Complete Time: 21:29                                        rn  

                                                                                             

20:59 Order name: CBC with Automated Diff; Complete Time: 22:32                               EDMS

                                                                                             

21:42 Order name: CBC Smear Scan; Complete Time: 22:32                                        EDMS

                                                                                             

20:59 Order name: Labs collected and sent; Complete Time: 21:29                               rn  

                                                                                             

20:59 Order name: O2 Per Protocol; Complete Time: 21:                                       rn  

                                                                                             

20:59 Order name: O2 Sat Monitoring; Complete Time: 21:13                                     rn  

                                                                                                  

EC:37 Rate is 63 beats/min. Rhythm is regular. QRS Axis is Normal. KS interval is normal. QRS rn  

      interval is normal. QT interval is normal. No Q waves. T waves are Normal. No ST            

      changes noted. Clinical impression: Normal ECG. Interpreted by me. Reviewed by me.          

                                                                                                  

Administered Medications:                                                                         

21:39 Drug: GI Cocktail without Donnatal - (Maalox Suspension 30 ml, Lidocaine Liquid 2 % 15  vg1 

      ml) Route: PO;                                                                              

23:00 Follow up: Response: No adverse reaction; Marked relief of symptoms; Pain is decreased  ca1 

                                                                                                  

                                                                                                  

Disposition:                                                                                      

21 22:43 Discharged to Home. Impression: Chest pain, unspecified, Gastro-esophageal         

  reflux disease.                                                                                 

- Condition is Stable.                                                                            

- Discharge Instructions: Nonspecific Chest Pain, Gastroesophageal Reflux Disease,                

  Adult.                                                                                          

                                                                                                  

- Medication Reconciliation Form, Thank You Letter, Antibiotic Education, Prescription            

  Opioid Use form.                                                                                

- Follow up: Private Physician; When: As needed; Reason: Recheck today's complaints,              

  Re-evaluation by your physician.                                                                

- Problem is new.                                                                                 

- Symptoms have improved.                                                                         

                                                                                                  

                                                                                                  

                                                                                                  

Signatures:                                                                                       

Dispatcher [employer]                           EDNelson Munoz MD MD   rn                                                   

Acob, Zulay RN                        RN   ca1                                                  

Barb Jensen RN                    RN   vg1                                                  

                                                                                                  

Corrections: (The following items were deleted from the chart)                                    

23:10 22:43 2021 22:43 Discharged to Home. Impression: Chest pain, unspecified;         ca1 

      Gastro-esophageal reflux disease. Condition is Stable. Forms are Medication                 

      Reconciliation Form, Thank You Letter, Antibiotic Education, Prescription Opioid Use.       

      Follow up: Private Physician; When: As needed; Reason: Recheck today's complaints,          

      Re-evaluation by your physician. Problem is new. Symptoms have improved. rn                 

                                                                                                  

**************************************************************************************************

## 2021-05-14 NOTE — RAD REPORT
EXAM DESCRIPTION:  RAD - Chest Single View - 5/13/2021 9:51 pm

 

CLINICAL HISTORY:  CHEST PAIN

Chest pain.

 

COMPARISON:  CHEST SINGLE VIEW dated 11/10/2014

 

FINDINGS:  Portable technique limits examination quality.

 

The lungs are grossly clear. The heart is normal in size. No displaced fractures.

 

IMPRESSION:  No acute intrathoracic process suspected.